# Patient Record
Sex: FEMALE | Race: WHITE | NOT HISPANIC OR LATINO | Employment: FULL TIME | URBAN - METROPOLITAN AREA
[De-identification: names, ages, dates, MRNs, and addresses within clinical notes are randomized per-mention and may not be internally consistent; named-entity substitution may affect disease eponyms.]

---

## 2024-10-06 ENCOUNTER — APPOINTMENT (OUTPATIENT)
Dept: CARDIOLOGY | Facility: HOSPITAL | Age: 59
End: 2024-10-06
Payer: COMMERCIAL

## 2024-10-06 ENCOUNTER — HOSPITAL ENCOUNTER (EMERGENCY)
Facility: HOSPITAL | Age: 59
Discharge: OTHER INSTITUTION | End: 2024-10-07
Attending: EMERGENCY MEDICINE
Payer: COMMERCIAL

## 2024-10-06 ENCOUNTER — APPOINTMENT (OUTPATIENT)
Dept: RADIOLOGY | Facility: HOSPITAL | Age: 59
End: 2024-10-06
Payer: COMMERCIAL

## 2024-10-06 DIAGNOSIS — C34.90 LUNG CANCER METASTATIC TO BRAIN (MULTI): ICD-10-CM

## 2024-10-06 DIAGNOSIS — R56.9 SEIZURE-LIKE ACTIVITY (MULTI): Primary | ICD-10-CM

## 2024-10-06 DIAGNOSIS — C79.31 LUNG CANCER METASTATIC TO BRAIN (MULTI): ICD-10-CM

## 2024-10-06 LAB
ALBUMIN SERPL BCP-MCNC: 3.4 G/DL (ref 3.4–5)
ALP SERPL-CCNC: 70 U/L (ref 33–110)
ALT SERPL W P-5'-P-CCNC: 8 U/L (ref 7–45)
ANION GAP SERPL CALC-SCNC: 17 MMOL/L (ref 10–20)
AST SERPL W P-5'-P-CCNC: 14 U/L (ref 9–39)
BASOPHILS # BLD AUTO: 0.07 X10*3/UL (ref 0–0.1)
BASOPHILS NFR BLD AUTO: 0.6 %
BILIRUB SERPL-MCNC: 0.4 MG/DL (ref 0–1.2)
BUN SERPL-MCNC: 22 MG/DL (ref 6–23)
CALCIUM SERPL-MCNC: 8.8 MG/DL (ref 8.6–10.3)
CARDIAC TROPONIN I PNL SERPL HS: 10 NG/L (ref 0–13)
CHLORIDE SERPL-SCNC: 100 MMOL/L (ref 98–107)
CO2 SERPL-SCNC: 22 MMOL/L (ref 21–32)
CREAT SERPL-MCNC: 0.84 MG/DL (ref 0.5–1.05)
EGFRCR SERPLBLD CKD-EPI 2021: 81 ML/MIN/1.73M*2
EOSINOPHIL # BLD AUTO: 0.04 X10*3/UL (ref 0–0.7)
EOSINOPHIL NFR BLD AUTO: 0.3 %
ERYTHROCYTE [DISTWIDTH] IN BLOOD BY AUTOMATED COUNT: 14.1 % (ref 11.5–14.5)
GLUCOSE SERPL-MCNC: 104 MG/DL (ref 74–99)
HCT VFR BLD AUTO: 30.5 % (ref 36–46)
HGB BLD-MCNC: 10 G/DL (ref 12–16)
IMM GRANULOCYTES # BLD AUTO: 0.07 X10*3/UL (ref 0–0.7)
IMM GRANULOCYTES NFR BLD AUTO: 0.6 % (ref 0–0.9)
INR PPP: 1.2 (ref 0.9–1.1)
LYMPHOCYTES # BLD AUTO: 0.84 X10*3/UL (ref 1.2–4.8)
LYMPHOCYTES NFR BLD AUTO: 7.2 %
MAGNESIUM SERPL-MCNC: 1.62 MG/DL (ref 1.6–2.4)
MCH RBC QN AUTO: 32.1 PG (ref 26–34)
MCHC RBC AUTO-ENTMCNC: 32.8 G/DL (ref 32–36)
MCV RBC AUTO: 98 FL (ref 80–100)
MONOCYTES # BLD AUTO: 0.99 X10*3/UL (ref 0.1–1)
MONOCYTES NFR BLD AUTO: 8.5 %
NEUTROPHILS # BLD AUTO: 9.7 X10*3/UL (ref 1.2–7.7)
NEUTROPHILS NFR BLD AUTO: 82.8 %
NRBC BLD-RTO: 0 /100 WBCS (ref 0–0)
PLATELET # BLD AUTO: 387 X10*3/UL (ref 150–450)
POTASSIUM SERPL-SCNC: 3.6 MMOL/L (ref 3.5–5.3)
PROT SERPL-MCNC: 6.5 G/DL (ref 6.4–8.2)
PROTHROMBIN TIME: 13.5 SECONDS (ref 9.8–12.8)
RBC # BLD AUTO: 3.12 X10*6/UL (ref 4–5.2)
SODIUM SERPL-SCNC: 135 MMOL/L (ref 136–145)
WBC # BLD AUTO: 11.7 X10*3/UL (ref 4.4–11.3)

## 2024-10-06 PROCEDURE — 84484 ASSAY OF TROPONIN QUANT: CPT | Performed by: NURSE PRACTITIONER

## 2024-10-06 PROCEDURE — 2500000002 HC RX 250 W HCPCS SELF ADMINISTERED DRUGS (ALT 637 FOR MEDICARE OP, ALT 636 FOR OP/ED): Performed by: NURSE PRACTITIONER

## 2024-10-06 PROCEDURE — 94640 AIRWAY INHALATION TREATMENT: CPT

## 2024-10-06 PROCEDURE — 80053 COMPREHEN METABOLIC PANEL: CPT | Performed by: NURSE PRACTITIONER

## 2024-10-06 PROCEDURE — 70450 CT HEAD/BRAIN W/O DYE: CPT

## 2024-10-06 PROCEDURE — 93005 ELECTROCARDIOGRAM TRACING: CPT

## 2024-10-06 PROCEDURE — 36415 COLL VENOUS BLD VENIPUNCTURE: CPT | Performed by: NURSE PRACTITIONER

## 2024-10-06 PROCEDURE — 2500000004 HC RX 250 GENERAL PHARMACY W/ HCPCS (ALT 636 FOR OP/ED): Performed by: NURSE PRACTITIONER

## 2024-10-06 PROCEDURE — 96375 TX/PRO/DX INJ NEW DRUG ADDON: CPT

## 2024-10-06 PROCEDURE — 96365 THER/PROPH/DIAG IV INF INIT: CPT

## 2024-10-06 PROCEDURE — 85025 COMPLETE CBC W/AUTO DIFF WBC: CPT | Performed by: NURSE PRACTITIONER

## 2024-10-06 PROCEDURE — 2500000004 HC RX 250 GENERAL PHARMACY W/ HCPCS (ALT 636 FOR OP/ED): Performed by: EMERGENCY MEDICINE

## 2024-10-06 PROCEDURE — 99285 EMERGENCY DEPT VISIT HI MDM: CPT | Mod: 25

## 2024-10-06 PROCEDURE — 70450 CT HEAD/BRAIN W/O DYE: CPT | Performed by: RADIOLOGY

## 2024-10-06 PROCEDURE — 85610 PROTHROMBIN TIME: CPT | Performed by: NURSE PRACTITIONER

## 2024-10-06 PROCEDURE — 83735 ASSAY OF MAGNESIUM: CPT | Performed by: NURSE PRACTITIONER

## 2024-10-06 RX ORDER — DEXAMETHASONE SODIUM PHOSPHATE 10 MG/ML
10 INJECTION INTRAMUSCULAR; INTRAVENOUS ONCE
Status: COMPLETED | OUTPATIENT
Start: 2024-10-06 | End: 2024-10-06

## 2024-10-06 RX ORDER — LEVETIRACETAM 10 MG/ML
1000 INJECTION INTRAVASCULAR ONCE
Status: COMPLETED | OUTPATIENT
Start: 2024-10-06 | End: 2024-10-06

## 2024-10-06 RX ORDER — ALBUTEROL SULFATE 0.83 MG/ML
2.5 SOLUTION RESPIRATORY (INHALATION) ONCE
Status: COMPLETED | OUTPATIENT
Start: 2024-10-06 | End: 2024-10-06

## 2024-10-06 ASSESSMENT — LIFESTYLE VARIABLES
HAVE YOU EVER FELT YOU SHOULD CUT DOWN ON YOUR DRINKING: NO
EVER HAD A DRINK FIRST THING IN THE MORNING TO STEADY YOUR NERVES TO GET RID OF A HANGOVER: NO
HAVE PEOPLE ANNOYED YOU BY CRITICIZING YOUR DRINKING: NO
TOTAL SCORE: 0
EVER FELT BAD OR GUILTY ABOUT YOUR DRINKING: NO

## 2024-10-06 NOTE — ED TRIAGE NOTES
Patient BIBA from home for a seizure. Per EMS family stated patient had a seizure prior to EMS arrival lasting roughly 5 mins, after patient was in ambulance patient began to seize with facial twitching and arms went tonic. Patient states prior to episode at home she had a sudden headache, with arm numbness, and involuntary tongue movement. Patient is currently receiving radiation and has a hx of Brain CA. Patient is observed with aphasia and left facial drooping. Patient received 2.5 of versed via EMS.

## 2024-10-06 NOTE — ED PROVIDER NOTES
Emergency Department Encounter  [facility] EMERGENCY MEDICINE    Patient: Kerline Medeiros  MRN: [id number]  : 1965  Date of Evaluation: 10/6/2024  ED Provider: MERA Mancuso    ED care was supervised by Dr. Collins who independently examined and evaluated the patient. Please see their attestation note for further details.    Limitations to history: none  Independent Historian: self  Records reviewed: Care everywhere, paper chart, Inpatient and outpatient notes    Chief Complaint       Chief Complaint   Patient presents with    Seizures     [place]    (Location/Symptom, Timing/Onset, Context/Setting, Quality, Duration, Modifying Factors, Severity) Note limiting factors.   Kerline Medeiros is a 59 y.o. female with past medical history of brain cancer, underwent radiation treatment on Friday, states that she is supposed to have radiation daily who presents to the emergency department complaining of new onset seizures, involuntary movements, was sitting with her family and it was witnessed that patient had uncontrollable tensing up of her body, patient states that she was awake during the entire time but had involuntary tongue movement and slurred speech, has noted facial droop, unsure if this is her baseline.  Reported when her family called EMS, EMS reports that she had another episode while in the ambulance, patient states it lasted approximately 5 minutes.      ROS:     Review of Systems  14 systems reviewed and otherwise acutely negative except as in the [place].          Past History   No past medical history on file.  No past surgical history on file.  Social History     Socioeconomic History    Marital status:        Medications/Allergies     There are no discharge medications for this patient.    Allergies   Allergen Reactions    Penicillins Anaphylaxis    Wellbutrin [Bupropion Hcl] Anaphylaxis        Physical Exam       ED Triage Vitals   Temp Pulse Resp BP   -- -- -- --      SpO2  Temp src Heart Rate Source Patient Position   -- -- -- --      BP Location FiO2 (%)     -- --         Physical Exam    GENERAL:  The patient appears nourished and normally developed. Vital signs as documented.     HEENT:  Head normocephalic, atraumatic, EOMs intact, PERRLA, Mucous membranes moist. Nares patent without copious rhinorrhea.  No lymphadenopathy.    PULMONARY:  Lungs are clear to auscultation, without any respiratory distress. Able to speak full sentences, no accessory muscle use    CARDIAC:   Normal rate. No murmurs, rubs or gallops    ABDOMEN:  Soft, non-distended, non-tender, BS positive x 4 quadrants, No rebound or guarding, no peritoneal signs, no CVA tenderness, no masses or organomegaly    MUSCULOSKELETAL:   Able to ambulate, Non edematous, with no obvious deformities. Pulses intact distal    SKIN:   Good color, with no significant rashes.  No pallor.    NEURO:  normal sensation and strength bilaterally.  Able to follow commands, + slurred speech, right facial droop        Diagnostics   Labs:  Results for orders placed or performed during the hospital encounter of 10/06/24   CBC and Auto Differential   Result Value Ref Range    WBC 11.7 (H) 4.4 - 11.3 x10*3/uL    nRBC 0.0 0.0 - 0.0 /100 WBCs    RBC 3.12 (L) 4.00 - 5.20 x10*6/uL    Hemoglobin 10.0 (L) 12.0 - 16.0 g/dL    Hematocrit 30.5 (L) 36.0 - 46.0 %    MCV 98 80 - 100 fL    MCH 32.1 26.0 - 34.0 pg    MCHC 32.8 32.0 - 36.0 g/dL    RDW 14.1 11.5 - 14.5 %    Platelets 387 150 - 450 x10*3/uL    Neutrophils % 82.8 40.0 - 80.0 %    Immature Granulocytes %, Automated 0.6 0.0 - 0.9 %    Lymphocytes % 7.2 13.0 - 44.0 %    Monocytes % 8.5 2.0 - 10.0 %    Eosinophils % 0.3 0.0 - 6.0 %    Basophils % 0.6 0.0 - 2.0 %    Neutrophils Absolute 9.70 (H) 1.20 - 7.70 x10*3/uL    Immature Granulocytes Absolute, Automated 0.07 0.00 - 0.70 x10*3/uL    Lymphocytes Absolute 0.84 (L) 1.20 - 4.80 x10*3/uL    Monocytes Absolute 0.99 0.10 - 1.00 x10*3/uL    Eosinophils  Absolute 0.04 0.00 - 0.70 x10*3/uL    Basophils Absolute 0.07 0.00 - 0.10 x10*3/uL   Comprehensive metabolic panel   Result Value Ref Range    Glucose 104 (H) 74 - 99 mg/dL    Sodium 135 (L) 136 - 145 mmol/L    Potassium 3.6 3.5 - 5.3 mmol/L    Chloride 100 98 - 107 mmol/L    Bicarbonate 22 21 - 32 mmol/L    Anion Gap 17 10 - 20 mmol/L    Urea Nitrogen 22 6 - 23 mg/dL    Creatinine 0.84 0.50 - 1.05 mg/dL    eGFR 81 >60 mL/min/1.73m*2    Calcium 8.8 8.6 - 10.3 mg/dL    Albumin 3.4 3.4 - 5.0 g/dL    Alkaline Phosphatase 70 33 - 110 U/L    Total Protein 6.5 6.4 - 8.2 g/dL    AST 14 9 - 39 U/L    Bilirubin, Total 0.4 0.0 - 1.2 mg/dL    ALT 8 7 - 45 U/L   Troponin I, High Sensitivity   Result Value Ref Range    Troponin I, High Sensitivity 10 0 - 13 ng/L   Magnesium   Result Value Ref Range    Magnesium 1.62 1.60 - 2.40 mg/dL   Protime-INR   Result Value Ref Range    Protime 13.5 (H) 9.8 - 12.8 seconds    INR 1.2 (H) 0.9 - 1.1   ECG 12 lead   Result Value Ref Range    Ventricular Rate 127 BPM    Atrial Rate 127 BPM    MT Interval 130 ms    QRS Duration 66 ms    QT Interval 318 ms    QTC Calculation(Bazett) 462 ms    P Axis 56 degrees    R Axis 35 degrees    T Axis 51 degrees    QRS Count 21 beats    Q Onset 225 ms    P Onset 170 ms    P Offset 215 ms    T Offset 384 ms    QTC Fredericia 408 ms     Radiographs:  CT head wo IV contrast   Final Result   Area of edema in the left frontal lobe which likely correspond to   patient's reported mass. There is hyperdensity within this area of   edema which may correspond to hypercellularity of mass or hemorrhage.   There is also an additional area of left paramidline hypodensity near   the vertex with hyperdensity medially which may similarly correspond   to hemorrhage or hypercellularity of mass. Comparison with outside   prior imaging is recommended to assess stability of the finding and   correlation with MRI examination.        MACRO:   Indra Flores discussed the significance  "and urgency of this critical   finding by telephone with the emergency room physician on 10/6/2024   at 7:27 pm.  (**-RCF-**) Findings:  See findings.        Signed by: Indra Flores 10/6/2024 7:29 PM   Dictation workstation:   [id number]          Procedures:   Procedures     EKG: Independently reviewed by this provider at 6065  Indication: Seizure-like activity  Rate: 127  Rhythm: Sinus tachycardia  Interval: Normal intervals  Axis: Normal axis  ST Segment: No ST elevation        Assessment   In brief, Kerline Medeiros is a 59 y.o. 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 undergoing radiation treatments at [facility]      Plan   Imaging, labs, transfer to [facility]    Differentials   Progression of metastasis  ICH  Stroke  arrythmia    ED Course     Diagnoses as of 10/08/24 1620   Seizure-like activity (Multi)   Lung cancer metastatic to brain (Multi)       Visit Vitals  /55   Pulse (!) 118   Resp 20   Ht 1.575 m (5' 2\")   Wt 45.4 kg (100 lb)   SpO2 94%   BMI 18.29 kg/m²   BSA 1.41 m²       Medications   dexAMETHasone (PF) (Decadron) injection 10 mg (10 mg intravenous Given 10/6/24 2059)   levETIRAcetam (Keppra) 1,000 mg in sodium chloride (iso)  mL (0 mg intravenous Stopped 10/6/24 2133)   albuterol 2.5 mg /3 mL (0.083 %) nebulizer solution 2.5 mg (2.5 mg nebulization Given 10/6/24 2149)   lactated Ringer's bolus 1,000 mL (0 mL intravenous Stopped 10/7/24 0114)       Plan of care discussed, patient is stable appearing and able to answer questions appropriately but with slurred speech, has visible right facial droop.  Sent for imaging which appears to have a progression of patient's metastatic process.  Was given decadron, Keppra and patient was transferred to [facility] for her care as she is established there.  Was accepted in transfer by their Heme-Onc attending.      Final Impression      1. Seizure-like activity (Multi)    2. Lung " cancer metastatic to brain (Multi)          DISPOSITION  Disposition: Transfer to [facility]  Patient condition is stable    Comment: Please note this report has been produced using speech recognition software and may contain errors related to that system including errors in grammar, punctuation, and spelling, as well as words and phrases that may be inappropriate.  If there are any questions or concerns please feel free to contact the dictating provider for clarification.    MERA Mancuso APRN-CNP  10/08/24 5078

## 2024-10-07 VITALS
OXYGEN SATURATION: 94 % | DIASTOLIC BLOOD PRESSURE: 55 MMHG | WEIGHT: 100 LBS | HEART RATE: 118 BPM | BODY MASS INDEX: 18.4 KG/M2 | RESPIRATION RATE: 20 BRPM | SYSTOLIC BLOOD PRESSURE: 106 MMHG | HEIGHT: 62 IN

## 2024-10-07 PROCEDURE — 2500000004 HC RX 250 GENERAL PHARMACY W/ HCPCS (ALT 636 FOR OP/ED): Performed by: EMERGENCY MEDICINE

## 2024-10-07 PROCEDURE — 96361 HYDRATE IV INFUSION ADD-ON: CPT

## 2024-10-13 LAB
ATRIAL RATE: 127 BPM
P AXIS: 56 DEGREES
P OFFSET: 215 MS
P ONSET: 170 MS
PR INTERVAL: 130 MS
Q ONSET: 225 MS
QRS COUNT: 21 BEATS
QRS DURATION: 66 MS
QT INTERVAL: 318 MS
QTC CALCULATION(BAZETT): 462 MS
QTC FREDERICIA: 408 MS
R AXIS: 35 DEGREES
T AXIS: 51 DEGREES
T OFFSET: 384 MS
VENTRICULAR RATE: 127 BPM